# Patient Record
Sex: FEMALE | Race: WHITE | Employment: OTHER | URBAN - METROPOLITAN AREA
[De-identification: names, ages, dates, MRNs, and addresses within clinical notes are randomized per-mention and may not be internally consistent; named-entity substitution may affect disease eponyms.]

---

## 2019-08-27 ENCOUNTER — HOSPITAL ENCOUNTER (EMERGENCY)
Facility: HOSPITAL | Age: 29
Discharge: HOME | End: 2019-08-27
Attending: EMERGENCY MEDICINE
Payer: COMMERCIAL

## 2019-08-27 VITALS
HEIGHT: 55 IN | SYSTOLIC BLOOD PRESSURE: 104 MMHG | BODY MASS INDEX: 37.03 KG/M2 | TEMPERATURE: 98 F | HEART RATE: 80 BPM | WEIGHT: 160 LBS | RESPIRATION RATE: 17 BRPM | DIASTOLIC BLOOD PRESSURE: 53 MMHG | OXYGEN SATURATION: 99 %

## 2019-08-27 DIAGNOSIS — G40.919 BREAKTHROUGH SEIZURE (HCC): Primary | ICD-10-CM

## 2019-08-27 LAB
ALBUMIN SERPL-MCNC: 4.5 G/DL (ref 3.4–5)
ALBUMIN/GLOB SERPL: 1.4 {RATIO} (ref 1–2)
ALP LIVER SERPL-CCNC: 58 U/L (ref 37–98)
ALT SERPL-CCNC: 17 U/L (ref 13–56)
ANION GAP SERPL CALC-SCNC: 15 MMOL/L (ref 0–18)
AST SERPL-CCNC: 16 U/L (ref 15–37)
BASOPHILS # BLD AUTO: 0.02 X10(3) UL (ref 0–0.2)
BASOPHILS NFR BLD AUTO: 0.1 %
BILIRUB SERPL-MCNC: 0.6 MG/DL (ref 0.1–2)
BUN BLD-MCNC: 13 MG/DL (ref 7–18)
BUN/CREAT SERPL: 15.9 (ref 10–20)
CALCIUM BLD-MCNC: 9.4 MG/DL (ref 8.5–10.1)
CHLORIDE SERPL-SCNC: 108 MMOL/L (ref 98–112)
CO2 SERPL-SCNC: 19 MMOL/L (ref 21–32)
CREAT BLD-MCNC: 0.82 MG/DL (ref 0.55–1.02)
DEPRECATED RDW RBC AUTO: 45.1 FL (ref 35.1–46.3)
EOSINOPHIL # BLD AUTO: 0 X10(3) UL (ref 0–0.7)
EOSINOPHIL NFR BLD AUTO: 0 %
ERYTHROCYTE [DISTWIDTH] IN BLOOD BY AUTOMATED COUNT: 12.6 % (ref 11–15)
GLOBULIN PLAS-MCNC: 3.3 G/DL (ref 2.8–4.4)
GLUCOSE BLD-MCNC: 129 MG/DL (ref 70–99)
HCT VFR BLD AUTO: 40.3 % (ref 35–48)
HGB BLD-MCNC: 12.9 G/DL (ref 12–16)
IMM GRANULOCYTES # BLD AUTO: 0.1 X10(3) UL (ref 0–1)
IMM GRANULOCYTES NFR BLD: 0.5 %
LYMPHOCYTES # BLD AUTO: 2.89 X10(3) UL (ref 1–4)
LYMPHOCYTES NFR BLD AUTO: 13.5 %
M PROTEIN MFR SERPL ELPH: 7.8 G/DL (ref 6.4–8.2)
MCH RBC QN AUTO: 30.9 PG (ref 26–34)
MCHC RBC AUTO-ENTMCNC: 32 G/DL (ref 31–37)
MCV RBC AUTO: 96.6 FL (ref 80–100)
MONOCYTES # BLD AUTO: 1.13 X10(3) UL (ref 0.1–1)
MONOCYTES NFR BLD AUTO: 5.3 %
NEUTROPHILS # BLD AUTO: 17.26 X10 (3) UL (ref 1.5–7.7)
NEUTROPHILS # BLD AUTO: 17.26 X10(3) UL (ref 1.5–7.7)
NEUTROPHILS NFR BLD AUTO: 80.6 %
OSMOLALITY SERPL CALC.SUM OF ELEC: 296 MOSM/KG (ref 275–295)
PLATELET # BLD AUTO: 349 10(3)UL (ref 150–450)
POTASSIUM SERPL-SCNC: 3.9 MMOL/L (ref 3.5–5.1)
RBC # BLD AUTO: 4.17 X10(6)UL (ref 3.8–5.3)
SODIUM SERPL-SCNC: 142 MMOL/L (ref 136–145)
WBC # BLD AUTO: 21.4 X10(3) UL (ref 4–11)

## 2019-08-27 PROCEDURE — 85025 COMPLETE CBC W/AUTO DIFF WBC: CPT | Performed by: EMERGENCY MEDICINE

## 2019-08-27 PROCEDURE — 80053 COMPREHEN METABOLIC PANEL: CPT | Performed by: EMERGENCY MEDICINE

## 2019-08-27 PROCEDURE — 96365 THER/PROPH/DIAG IV INF INIT: CPT

## 2019-08-27 PROCEDURE — 96361 HYDRATE IV INFUSION ADD-ON: CPT

## 2019-08-27 PROCEDURE — 99284 EMERGENCY DEPT VISIT MOD MDM: CPT

## 2019-08-27 PROCEDURE — 96375 TX/PRO/DX INJ NEW DRUG ADDON: CPT

## 2019-08-27 RX ORDER — LORAZEPAM 2 MG/ML
INJECTION INTRAMUSCULAR
Status: COMPLETED
Start: 2019-08-27 | End: 2019-08-27

## 2019-08-27 RX ORDER — LORAZEPAM 2 MG/ML
1 INJECTION INTRAMUSCULAR ONCE
Status: COMPLETED | OUTPATIENT
Start: 2019-08-27 | End: 2019-08-27

## 2019-08-27 NOTE — ED PROVIDER NOTES
Patient Seen in: [facility] Emergency Department    History   Patient presents with:  Seizures    Stated Complaint: POSSIBLE SEIZURES    HPI    80-year-old female who has not been compliant with her seizure medication over the past 2 weeks who prese equal reactive to light. Extra ocular motions are intact. No scleral icterus or conjunctival pallor: Neck is supple without tenderness on palpation. Head is atraumatic normocephalic. Oral mucosa moist.  Tongue is midline.   Evidence of tongue biting not roommate to figure out what medication she is to be taking. In the interim we will check her electrolytes to make sure there are no changes that would preclude her to having seizures.   The patient was able to contact her roommate who told her that she was

## 2019-08-27 NOTE — ED NOTES
Report received from Neris SANTACRUZ at this time. Patient alert at this time and answering questions. Attempting to figure out what medication she takes for seizures. Coworker at bedside. Dr. Brady at bedside.

## 2019-08-27 NOTE — ED NOTES
Patient alert and appropriate. Feeling better at this time. Friends at bedside. Informed by patient that her neurologist has called in a prescription for keppra to the nearest [facility] and her sister will help her to get this prescription.  Patient ready f

## 2019-08-27 NOTE — ED INITIAL ASSESSMENT (HPI)
Pt here for seizures, actively seizing , [facility] coworker [facility] [place]

## 2019-08-28 NOTE — ED NOTES
Per Dr. Nikko Graff, okay to give patient a note to fly home when she returns to the ER to pick it up.

## 2024-09-23 NOTE — ED NOTES
Patient assisted into wheelchair. Called [facility] on [address] in [place] to confirm they received prescription for keppra, informed it is ready and waiting. Patient alert and appropriate. All questions answered. Friends at bedside to drive her home. normal for race

## (undated) NOTE — LETTER
Date & Time: 8/27/2019, 7:34 PM  Patient: Nimo De La Paz  Encounter Provider(s): Sherri Sanders MD       To Whom It May Concern:    Nimo De La Paz was seen and treated in our department on 8/27/2019. She may fly home and follow up with her physician.

## (undated) NOTE — ED AVS SNAPSHOT
Noah Alvarez   MRN: [id number]    Department:  [facility] Emergency Department   Date of Visit:  8/27/2019           Disclosure     Insurance plans vary and the physician(s) referred by the ER may not be covered by your plan.  Please contact your tell this physician (or your personal doctor if your instructions are to return to your personal doctor) about any new or lasting problems. The primary care or specialist physician will see patients referred from the [facility] Emergency Department.  Iker Tanner